# Patient Record
Sex: MALE | Race: ASIAN | NOT HISPANIC OR LATINO | URBAN - METROPOLITAN AREA
[De-identification: names, ages, dates, MRNs, and addresses within clinical notes are randomized per-mention and may not be internally consistent; named-entity substitution may affect disease eponyms.]

---

## 2017-09-16 ENCOUNTER — EMERGENCY (EMERGENCY)
Facility: HOSPITAL | Age: 27
LOS: 1 days | Discharge: HOME | End: 2017-09-16
Attending: EMERGENCY MEDICINE
Payer: COMMERCIAL

## 2017-09-16 VITALS
OXYGEN SATURATION: 99 % | SYSTOLIC BLOOD PRESSURE: 111 MMHG | DIASTOLIC BLOOD PRESSURE: 73 MMHG | WEIGHT: 130.07 LBS | RESPIRATION RATE: 16 BRPM | HEART RATE: 80 BPM | TEMPERATURE: 98 F | HEIGHT: 66 IN

## 2017-09-16 VITALS
RESPIRATION RATE: 16 BRPM | HEART RATE: 72 BPM | SYSTOLIC BLOOD PRESSURE: 109 MMHG | DIASTOLIC BLOOD PRESSURE: 67 MMHG | OXYGEN SATURATION: 100 %

## 2017-09-16 PROCEDURE — 99283 EMERGENCY DEPT VISIT LOW MDM: CPT

## 2017-09-16 PROCEDURE — 81003 URINALYSIS AUTO W/O SCOPE: CPT

## 2017-09-16 RX ORDER — DIAZEPAM 5 MG
1 TABLET ORAL
Qty: 9 | Refills: 0 | OUTPATIENT
Start: 2017-09-16 | End: 2017-09-19

## 2017-09-16 RX ORDER — IBUPROFEN 200 MG
600 TABLET ORAL ONCE
Qty: 0 | Refills: 0 | Status: COMPLETED | OUTPATIENT
Start: 2017-09-16 | End: 2017-09-16

## 2017-09-16 RX ORDER — ACETAMINOPHEN 500 MG
650 TABLET ORAL ONCE
Qty: 0 | Refills: 0 | Status: COMPLETED | OUTPATIENT
Start: 2017-09-16 | End: 2017-09-16

## 2017-09-16 RX ORDER — DIAZEPAM 5 MG
5 TABLET ORAL ONCE
Qty: 0 | Refills: 0 | Status: DISCONTINUED | OUTPATIENT
Start: 2017-09-16 | End: 2017-09-16

## 2017-09-16 RX ADMIN — Medication 600 MILLIGRAM(S): at 14:20

## 2017-09-16 RX ADMIN — Medication 650 MILLIGRAM(S): at 14:22

## 2017-09-16 RX ADMIN — Medication 5 MILLIGRAM(S): at 14:20

## 2017-09-16 NOTE — ED PROVIDER NOTE - OBJECTIVE STATEMENT
26 y/o healthy male with no significant PMHx presents to ED c/o waxing and waning B/L low back pain x 2 months, pain worse today after heavy lifting. He notes the pain radiates from R to L aspect of his low back, occasionally shooting down his R leg, pain stopping at the knee. Patient reports the pain is worse at night. He has been taking Ibuprofen, prescribed by PMD, as well as RX, patient does not recall name, with mild sx relief. He has not taken any medication today to subside pain. He is able to ambulate without assistance. He denies any testicular pain, urinary symptoms, saddle anesthesia or any other complaints at this time.

## 2017-09-20 DIAGNOSIS — M54.5 LOW BACK PAIN: ICD-10-CM

## 2023-10-09 ENCOUNTER — EMERGENCY (EMERGENCY)
Facility: HOSPITAL | Age: 33
LOS: 1 days | Discharge: HOME | End: 2023-10-09
Admitting: EMERGENCY MEDICINE
Payer: COMMERCIAL

## 2023-10-09 VITALS
SYSTOLIC BLOOD PRESSURE: 124 MMHG | RESPIRATION RATE: 18 BRPM | DIASTOLIC BLOOD PRESSURE: 72 MMHG | OXYGEN SATURATION: 100 % | HEART RATE: 76 BPM | TEMPERATURE: 98 F

## 2023-10-09 PROCEDURE — 99283 EMERGENCY DEPT VISIT LOW MDM: CPT

## 2023-10-09 NOTE — ED PROVIDER NOTE - OBJECTIVE STATEMENT
34 y/o male no PMH presents to ER c/o left ankle pain and dc. Pt. states has history of old ankle injury 10 years ago - unclear exactly what happened - states has a lot residual scarring. States last week noticed that the scarred area looked slightly swollen and had a small scab that he squeezed and noticed small amount of white discharge come out. Pt. states after squeezing dc out he states it has felt better but came to ER for eval due to still having mild pain. denies fever chills redness numbness tingling.

## 2023-10-09 NOTE — ED PROVIDER NOTE - PHYSICAL EXAMINATION
Gen: Well appearing in NAD  Head: NC/AT  Neck: trachea midline  Resp:  No distress  Ext: no deformities  Neuro:  A&O appears non focal  Skin:  Warm and dry as visualized  Psych:  Normal affect and mood     left medial ankle" + visualzed scarred skin with small scab superior to medial maleolus soft tissue. mild edema. no redness no fluctuance or induration. no discharge on expressing area. nontender to palpation. pt bearing weigt without difficulty sensations intact.

## 2023-10-09 NOTE — ED ADULT TRIAGE NOTE - CHIEF COMPLAINT QUOTE
pt c/o possible infection to left ankle. Endorses white discharge from old ankle injury. Denies fevers/chills. Denies Hx

## 2023-10-09 NOTE — ED PROVIDER NOTE - PATIENT PORTAL LINK FT
You can access the FollowMyHealth Patient Portal offered by [facility] by registering at the following website: [email or web address]. By joining [facility]’s FollowMyHealth portal, you will also be able to view your health information using other applications (apps) compatible with our system.

## 2023-10-09 NOTE — ED ADULT TRIAGE NOTE - NS ED TRIAGE AVPU SCALE
How Severe Is Your Skin Discoloration?: moderate Alert-The patient is alert, awake and responds to voice. The patient is oriented to time, place, and person. The triage nurse is able to obtain subjective information.

## 2023-10-09 NOTE — ED PROVIDER NOTE - NSFOLLOWUPINSTRUCTIONS_ED_ALL_ED_FT
Wound Infection    WHAT YOU NEED TO KNOW:    A wound infection occurs when bacteria enters a break in the skin. The infection may involve just the skin, or affect deeper tissues or organs close to the wound.     DISCHARGE INSTRUCTIONS:    Return to the emergency department if:     You feel short of breath.       Your heart is beating faster than usual.       You feel confused.       Blood soaks through your bandages.      Your wound comes apart or feels like it is ripping.       You have severe pain.      You see red streaks coming from the infected area.    Contact your healthcare provider if:     You have a fever or chills.       You have more pain, redness, or swelling near your wound.      Your symptoms do not improve.       The skin around your wound feels numb.      You have questions or concerns about your condition or care.    Medicines: You may need any of the following:     NSAIDs, such as ibuprofen, help decrease swelling, pain, and fever. This medicine is available with or without a doctor's order. NSAIDs can cause stomach bleeding or kidney problems in certain people. If you take blood thinner medicine, always ask your healthcare provider if NSAIDs are safe for you. Always read the medicine label and follow directions.      Antibiotics help treat a bacterial infection.       Take your medicine as directed. Contact your healthcare provider if you think your medicine is not helping or if you have side effects. Tell him or her if you are allergic to any medicine. Keep a list of the medicines, vitamins, and herbs you take. Include the amounts, and when and why you take them. Bring the list or the pill bottles to follow-up visits. Carry your medicine list with you in case of an emergency.    Care for your wound as directed: Keep your wound clean and dry. You may need to cover your wound when you bathe so it does not get wet. Clean your wound as directed with soap and water or wound . Put on new, clean bandages as directed. Change your bandages when they get wet or dirty.    Help your wound heal:     Eat a variety of healthy foods. Examples include fruits, vegetables, whole-grain breads, low-fat dairy products, beans, lean meats, and fish. Healthy foods may help you heal faster. You may also need to take vitamins and minerals. Ask if you need to be on a special diet.      Manage other health conditions. Follow your healthcare provider's directions to manage health conditions that can cause slow wound healing. Examples include high blood pressure and diabetes.      Do not smoke. Nicotine and other chemicals in cigarettes and cigars can cause slow wound healing. Ask your healthcare provider for information if you currently smoke and need help to quit. E-cigarettes or smokeless tobacco still contain nicotine. Talk to your healthcare provider before you use these products.     Follow up with your healthcare provider in 1 to 2 days: Write down your questions so you remember to ask them during your visits.       © Copyright [employer] 2019 All illustrations and images included in CareNotes are the copyrighted property of [employer] or [employer]

## 2023-10-09 NOTE — ED PROVIDER NOTE - CLINICAL SUMMARY MEDICAL DECISION MAKING FREE TEXT BOX
32 y/o male c/o left medial ankle wound issue  -no over signs of infection or abscess, does not warrant imaging at this time  -local wound care  -oupt podiatry follow up

## 2023-10-25 ENCOUNTER — APPOINTMENT (OUTPATIENT)
Dept: WOUND CARE | Facility: HOSPITAL | Age: 33
End: 2023-10-25

## 2024-02-12 ENCOUNTER — APPOINTMENT (OUTPATIENT)
Dept: ORTHOPEDIC SURGERY | Facility: CLINIC | Age: 34
End: 2024-02-12
Payer: COMMERCIAL

## 2024-02-12 VITALS — BODY MASS INDEX: 21.69 KG/M2 | WEIGHT: 135 LBS | HEIGHT: 66 IN

## 2024-02-12 DIAGNOSIS — Z78.9 OTHER SPECIFIED HEALTH STATUS: ICD-10-CM

## 2024-02-12 DIAGNOSIS — M25.572 PAIN IN LEFT ANKLE AND JOINTS OF LEFT FOOT: ICD-10-CM

## 2024-02-12 PROCEDURE — 99203 OFFICE O/P NEW LOW 30 MIN: CPT

## 2024-02-12 PROCEDURE — 73610 X-RAY EXAM OF ANKLE: CPT | Mod: LT

## 2024-02-12 NOTE — HISTORY OF PRESENT ILLNESS
[9] : 9 [4] : 4 [Dull/Aching] : dull/aching [Sharp] : sharp [de-identified] : 02/12/2024:  2. weeks ankle pain with no known mechanism.  has recent infection 2 months ago and had vascular procedure at that time as well but unable to describe what was done. no longer on abx. denies dm/tob. saw vascular who rec orthopedic eval [FreeTextEntry1] : left ankle

## 2024-02-12 NOTE — PHYSICAL EXAM
[Left] : left foot and ankle [Mild] : mild diffused ankle swelling [NL (40)] : plantar flexion 40 degrees [NL 30)] : inversion 30 degrees [NL (20)] : eversion 20 degrees [5___] : eversion 5[unfilled]/5 [2+] : dorsalis pedis pulse: 2+ [] : patient ambulates without assistive device [FreeTextEntry3] : healed wound medial ankle [FreeTextEntry8] : mild posterior ankle ttp [TWNoteComboBox7] : dorsiflexion 15 degrees

## 2024-02-12 NOTE — REVIEW OF SYSTEMS
Chief Complaint   Patient presents with     Consult     [facility]     [Joint Pain] : joint pain [Joint Swelling] : joint swelling

## 2024-03-06 NOTE — ED ADULT NURSE NOTE - NS ED NOTE  TALK SOMEONE YN
Quality 130: Documentation Of Current Medications In The Medical Record: Current Medications Documented
Detail Level: Detailed
Quality 226: Preventive Care And Screening: Tobacco Use: Screening And Cessation Intervention: Patient screened for tobacco use and is an ex/non-smoker
Quality 431: Preventive Care And Screening: Unhealthy Alcohol Use - Screening: Patient not identified as an unhealthy alcohol user when screened for unhealthy alcohol use using a systematic screening method
Quality 128: Preventive Care And Screening: Body Mass Index (Bmi) Screening And Follow-Up Plan: BMI documented as out of range, follow up plan not documented, reason not otherwise specified.
Quality 47: Advance Care Plan: Advance Care Planning discussed and documented; advance care plan or surrogate decision maker documented in the medical record.
No

## 2024-07-03 NOTE — ED PROVIDER NOTE - MEDICAL DECISION MAKING DETAILS
Within functional limits
Within functional limits
26 y/o male no PMHx, well-appearing, c/o back pain x 2 months with occasional R LE pain radiating to popliteal fossa. He denies any saddle anesthesia, drug abuse, unknown weight loss, no signs or symptoms of spinal cord compression. Exam noncontributory. will check UA and likely dc with analgesia and OP f/u